# Patient Record
Sex: FEMALE | Race: WHITE
[De-identification: names, ages, dates, MRNs, and addresses within clinical notes are randomized per-mention and may not be internally consistent; named-entity substitution may affect disease eponyms.]

---

## 2018-08-27 ENCOUNTER — HOSPITAL ENCOUNTER (OUTPATIENT)
Age: 73
End: 2018-08-27
Payer: MEDICARE

## 2018-08-27 DIAGNOSIS — C54.9: ICD-10-CM

## 2018-08-27 DIAGNOSIS — R91.8: Primary | ICD-10-CM

## 2018-08-27 PROCEDURE — 71250 CT THORAX DX C-: CPT

## 2018-08-27 NOTE — DI.CT.S_ITS
PROCEDURE:  CT CHEST WO CON  
   
INDICATIONS:  lung nodule  
   
TECHNIQUE:    
Noncontrast 2.0-2.5 mm thick sections acquired from the pulmonary apices to the posterior   
costophrenic angles.  7 mm thick coronal and sagittal MIP reformats were then acquired.    
A low radiation dose technique was utilized.    
   
COMPARISON:  [facility], CT, CHEST/ABD/PEL WITH CONTRAST, 4/03/2014, 9:14.  [facility], CT, CHEST/ABD/PEL WITH CONTRAST, 4/23/2015, 9:13.  [facility], CT, THORAX   
WITHOUT CONTRAST, 10/29/2015, 8:29.  [facility], CT, CHEST/ABD/PEL WITH CONTRAST,   
8/09/2017, 10:44.  
   
FINDINGS:    
Image quality:  Diagnostic, given the low radiation dose technique.    
   
Lungs and pleura: 3 mm pulmonary nodule in the anterior right middle lobe, series 3/image   
75, remains unchanged from initial exam. Focal thickening in the right fissure, image 81,   
unchanged. Left lower lobe nodule is again seen anterolaterally on image 88. The 3 mm   
subpleural nodule in the superior segment left lower lobe, new on most recent prior study   
is persistent but unchanged, image 46. It in the anterior left upper lobe, image 36, is a   
round pulmonary nodule measuring 5 mm in diameter on current study, 4 mm on last exam and   
not apparent on exams prior to 2017. In addition, just inferior to the 5 mm nodule, image   
40, is a second 5 mm nodule that is new since last study. Also new on image 27, is a 6 mm   
subpleural nodule in the anterior left upper lobe with a 2 mm nodule immediately   
inferior.  
   
Mediastinum:  Heart size is normal. Mild coronary artery calcifications. No pericardial   
effusion.  No mediastinal adenopathy by size criteria.  Thoracic aorta and central   
pulmonary arteries are normal in size.  Esophagus is normal in caliber.  No hiatal   
hernia.    
   
Bones and chest wall:  No suspicious bony lesions.  No vertebral body compression   
fractures.  No axillary or supraclavicular adenopathy by size criteria.  Thyroid gland is   
normal in size.    
   
Abdomen:  Visualized upper abdomen solid organs and bowel loops appear normal in the   
absence of contrast.    
   
IMPRESSION:  
   
1. There are pulmonary nodules bilaterally that are unchanged from April 2014.  
   
2. There is a left upper lobe pulmonary nodules that was new in August 2017 and is   
increased slightly in size.  
   
3. There  are 3 new left upper lobe pulmonary nodules measuring up to 6 mm in maximum   
diameter. These nodules remain indeterminate but are of concern for metastatic disease   
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  
   
   
Fleischner Society criteria for SOLID lung nodule followup.    
Nodule size (mm)Low-risk patientHigh-risk patient<6 (single or multiple)No routine   
followup.Optional CT at 12 months. 6-8 (single or multiple)CT at 6-12 months, then   
optional CT at 18-24 mo.CT at 6-12 months, then CT at 18-24 months.  >8 (single)CT at 3   
months, PET-CT, or biopsy. Same as for low-risk pts.  >8 (multiple)CT at 3-6 months, then   
optional CT at 18-24 mo.CT at 3-6 months, then CT at 18-24 months.    
Recommendations do not apply to lung cancer screening, patients with immunosuppression,   
or patients with known primary cancer.   
   
   
   
Dictated by: Roque Sheth M.D. on 8/27/2018 at 10:02       
Approved by: Roque Sheth M.D. on 8/27/2018 at 10:42

## 2019-04-09 ENCOUNTER — HOSPITAL ENCOUNTER (OUTPATIENT)
Age: 74
End: 2019-04-09
Payer: MEDICARE

## 2019-04-09 DIAGNOSIS — R91.8: ICD-10-CM

## 2019-04-09 DIAGNOSIS — C55: Primary | ICD-10-CM

## 2019-04-09 PROCEDURE — 71250 CT THORAX DX C-: CPT

## 2019-04-09 NOTE — DI.CT.S_ITS
PROCEDURE:  CT CHEST WO CON  
   
INDICATIONS:  UTERINE SARCOMA, LUNG NODULES  
   
TECHNIQUE:    
Noncontrast 2.0-2.5 mm thick sections acquired from the pulmonary apices to the posterior   
costophrenic angles.  7 mm thick coronal and sagittal MIP reformats were then acquired.    
A low radiation dose technique was utilized.    
   
COMPARISON:  [facility], CT, CHEST/ABD/PEL WITH CONTRAST, 8/09/2017, 10:44.  [facility], CT, THORAX WITHOUT CONTRAST, 10/29/2015, 8:29.  [facility], CT,   
CHEST/ABD/PEL WITH CONTRAST, 4/23/2015, 9:13.  [facility], CT, CHEST/ABD/PEL WITH   
CONTRAST, 4/03/2014, 9:14.  [facility], CT, CT CHEST WO CON, 8/27/2018, 9:43.  
   
FINDINGS:    
Image quality:  Diagnostic, given the low radiation dose technique.    
   
Lungs and pleura: On the left there are 7 separate pulmonary nodules each of which has   
enlarged in size sided to a slight or moderate degree.  Several previously present   
nodules were of such a small size on the CT scanning 8/27/18 that they were not   
recognized as nodules separate from vessels are not their growth over time indicates each   
of these likely is a metastatic nodule.  A single nodule is present on the right,   
anterior border of the medial segment right middle lobe, and it has not definitely   
enlarged.  The left-sided nodules are seen 2 on image 30, 2 on image 40, one on image 44,   
one on image 51, 1 on image 79, and 1 just above the diaphragm on image 114.  A single   
nodule on the right is seen on image 77.  
   
Mediastinum:  Heart size is normal.  No pericardial effusion.  No mediastinal adenopathy   
by size criteria.  Thoracic aorta and central pulmonary arteries are normal in size.    
Esophagus is normal in caliber.  No hiatal hernia.    
   
Bones and chest wall:  No suspicious bony lesions.  No vertebral body compression   
fractures.  No axillary or supraclavicular adenopathy by size criteria.  Thyroid gland is   
not well-seen but is noncontrast technique..    
   
Abdomen:  Visualized upper abdomen solid organs and bowel loops appear normal in the   
absence of contrast.    
   
IMPRESSION: 7 left-sided pulmonary nodules have all enlarged either to a slight or   
moderate degree.  The largest pulmonary nodules are at the left upper lobe and measure up   
to 9-10 mm in maximal dimension.  Each of these likely is a metastatic nodule, slow   
growing.  
   
A single right-sided pulmonary nodule has not changed in size and may represent a small   
granuloma or quiecent metastatic nodule.  
   
   
   
Dictated by: Herrera Weinstein M.D. on 4/09/2019 at 15:04       
Approved by: Herrera Weinstein M.D. on 4/09/2019 at 15:16

## 2019-04-22 ENCOUNTER — HOSPITAL ENCOUNTER (OUTPATIENT)
Age: 74
Discharge: HOME | End: 2019-04-22
Payer: MEDICARE

## 2019-04-22 VITALS
SYSTOLIC BLOOD PRESSURE: 166 MMHG | TEMPERATURE: 98.6 F | OXYGEN SATURATION: 98 % | RESPIRATION RATE: 16 BRPM | HEART RATE: 71 BPM | DIASTOLIC BLOOD PRESSURE: 82 MMHG

## 2019-04-22 VITALS
SYSTOLIC BLOOD PRESSURE: 170 MMHG | RESPIRATION RATE: 17 BRPM | HEART RATE: 73 BPM | TEMPERATURE: 98.78 F | DIASTOLIC BLOOD PRESSURE: 86 MMHG | OXYGEN SATURATION: 97 %

## 2019-04-22 VITALS
TEMPERATURE: 98.6 F | DIASTOLIC BLOOD PRESSURE: 75 MMHG | HEART RATE: 76 BPM | OXYGEN SATURATION: 99 % | SYSTOLIC BLOOD PRESSURE: 172 MMHG | RESPIRATION RATE: 17 BRPM

## 2019-04-22 VITALS
OXYGEN SATURATION: 98 % | RESPIRATION RATE: 18 BRPM | HEART RATE: 78 BPM | SYSTOLIC BLOOD PRESSURE: 171 MMHG | DIASTOLIC BLOOD PRESSURE: 80 MMHG

## 2019-04-22 VITALS
DIASTOLIC BLOOD PRESSURE: 83 MMHG | OXYGEN SATURATION: 96 % | TEMPERATURE: 97.2 F | HEART RATE: 80 BPM | RESPIRATION RATE: 16 BRPM | SYSTOLIC BLOOD PRESSURE: 186 MMHG

## 2019-04-22 VITALS
SYSTOLIC BLOOD PRESSURE: 157 MMHG | DIASTOLIC BLOOD PRESSURE: 90 MMHG | OXYGEN SATURATION: 98 % | HEART RATE: 76 BPM | RESPIRATION RATE: 18 BRPM

## 2019-04-22 VITALS
OXYGEN SATURATION: 99 % | HEART RATE: 76 BPM | RESPIRATION RATE: 20 BRPM | DIASTOLIC BLOOD PRESSURE: 79 MMHG | SYSTOLIC BLOOD PRESSURE: 156 MMHG

## 2019-04-22 VITALS
TEMPERATURE: 97.16 F | DIASTOLIC BLOOD PRESSURE: 75 MMHG | RESPIRATION RATE: 16 BRPM | HEART RATE: 78 BPM | OXYGEN SATURATION: 98 % | SYSTOLIC BLOOD PRESSURE: 184 MMHG

## 2019-04-22 VITALS
HEART RATE: 76 BPM | RESPIRATION RATE: 20 BRPM | DIASTOLIC BLOOD PRESSURE: 73 MMHG | SYSTOLIC BLOOD PRESSURE: 166 MMHG | OXYGEN SATURATION: 99 %

## 2019-04-22 VITALS
SYSTOLIC BLOOD PRESSURE: 162 MMHG | HEART RATE: 77 BPM | DIASTOLIC BLOOD PRESSURE: 79 MMHG | OXYGEN SATURATION: 99 % | RESPIRATION RATE: 20 BRPM

## 2019-04-22 VITALS
SYSTOLIC BLOOD PRESSURE: 183 MMHG | TEMPERATURE: 97.7 F | OXYGEN SATURATION: 99 % | RESPIRATION RATE: 17 BRPM | HEART RATE: 79 BPM | DIASTOLIC BLOOD PRESSURE: 90 MMHG

## 2019-04-22 VITALS — BODY MASS INDEX: 26.5 KG/M2

## 2019-04-22 VITALS
RESPIRATION RATE: 20 BRPM | SYSTOLIC BLOOD PRESSURE: 164 MMHG | HEART RATE: 70 BPM | OXYGEN SATURATION: 100 % | DIASTOLIC BLOOD PRESSURE: 88 MMHG

## 2019-04-22 DIAGNOSIS — R91.8: Primary | ICD-10-CM

## 2019-04-22 DIAGNOSIS — Z85.42: ICD-10-CM

## 2019-04-22 LAB
ADD MANUAL DIFF / SLIDE REVIEW: NO
HEMATOCRIT: 40 % (ref 36–46)
HEMOGLOBIN: 13.6 G/DL (ref 12–16)
INR PPP: 1.1 (ref 0.9–1.3)
LYMPHOCYTES # SPEC AUTO: 1600 /UL (ref 1100–4500)
MCV RBC: 93.6 FL (ref 80–100)
MEAN CORPUSCULAR HEMOGLOBIN: 31.9 PG (ref 26–34)
MEAN CORPUSCULAR HGB CONC: 34.1 % (ref 30–36)
PLATELET COUNT: 241 X10^3/UL (ref 150–400)
PROTHROMBIN TIME: 13.3 SECONDS (ref 10.1–12.7)

## 2019-04-22 PROCEDURE — 71045 X-RAY EXAM CHEST 1 VIEW: CPT

## 2019-04-22 PROCEDURE — BB24ZZZ COMPUTERIZED TOMOGRAPHY (CT SCAN) OF BILATERAL LUNGS: ICD-10-PCS

## 2019-04-22 PROCEDURE — 77012 CT SCAN FOR NEEDLE BIOPSY: CPT

## 2019-04-22 PROCEDURE — 36415 COLL VENOUS BLD VENIPUNCTURE: CPT

## 2019-04-22 PROCEDURE — 85610 PROTHROMBIN TIME: CPT

## 2019-04-22 PROCEDURE — 85025 COMPLETE CBC W/AUTO DIFF WBC: CPT

## 2019-04-22 PROCEDURE — 32405: CPT

## 2019-04-22 PROCEDURE — 88305 TISSUE EXAM BY PATHOLOGIST: CPT

## 2019-04-22 NOTE — PC.NURSE
Procedure finished at 1119, pt did not receive sedation for this.  She coughed up a little bit of blood post left lung biopsy. Took pt to PACU and handed off report to Kojo FREIRE for continued monitoring.

## 2019-04-22 NOTE — PATH_ITS
[facility] Accession Number: [id number]  
.                                                                01  
Material submitted:                                        .  
lung - LEFT LUNG  
.                                                                01  
Clinical history:                                          .  
ANTERIOR WALDEMAR NODULE 1CM, PRIOR H/O UTERINE  
ENDOMETRIAL SARCOMA. SEVERAL SMALL PALM  
NODULES ALL RECENTLY GROWING IN SIZE  
.                                                                02  
**********************************************************************  
Diagnosis:  
Left Lung, Core Needle Biopsy:  
     Benign pulmonary parenchymal fragments and blood.  
     No evidence of neoplasm.  
MRV/04/24/2019  
**********************************************************************  
.                                                                02  
Comment:  
Dr. David left a message with Dr. Bell's office to discuss  
results on 04/24/2019.  
.                                                                02  
Electronically signed:                                     .  
Zaida David MD, Pathologist  
NPI- [id number]  
.                                                                01  
Gross description:                                         .  
Received in one formalin-filled container, labeled with the patient's name  
and designated  anterior WALDEMAR nodule , are multiple extremely tiny  
fragments of dark red-tan tissue that range in size from less than 0.1 cm  
to 0.1 cm. The specimen is entirely submitted in one cassette. (DC:[id number]  
[id number])  
/FRR  
.                                                                02  
Microscopic:                                                    .  
Additional deeper levels were examined.  
.                                                                02  
Pathologist provided ICD-10:  
R91.8  
.                                                                02  
CPT                                                        .  
[id number]  
***Performed at:  01  
   [facility]  
   [address] [facility], [place], [place]  [id number]  
   MD Daniel Toweill MD Phone:  [phone]  
***Performed at:  02  
   [facility]  
   [address] [place], [place]  [id number]  
   MD Zaida David MD Phone:  [phone]

## 2019-04-22 NOTE — DI.CT.S_ITS
PROCEDURE:  CT BIOPSY LUNG LT  
Sedation analgesia not utilized.  
   
INDICATIONS: Prior history of uterine sarcoma, recent CT scanning showing several   
pulmonary nodules which are sharply demarcated and have definitely enlarged in size   
indicating potential growth of metastatic disease as the underlying cause.  
   
TECHNIQUE:    
The indications, alternatives, benefits, risks, and possible complications of the   
procedure were communicated to the patient.  Informed written consent from the patient   
was obtained and placed in the chart.  Continuous EKG and hemodynamic monitoring was   
started by trained personnel.    
   
The patient was brought to the CT suite and  spiral CT imaging was performed with   
localization grid.  The appropriate site for percutaneous access to the biopsy target was   
marked, was prepped and draped sterilely, and was infused with local anaesthesia.  Under   
CT guidance, a core biopsy trocar and needle set was advanced to the biopsy target, and   
specimen(s) were obtained.  The trocar and needle were then removed, and the patient was   
sent for post-procedure monitoring.    
   
COMPARISON:  [facility], CT, CT CHEST WO CON, 4/09/2019, 11:01.  
   
FINDINGS:    
Biopsy site: Anterior left upper lobe, rib positioning required angulation of the biopsy   
device laterally from a medial approach.  Upper lobe nodule biopsy was chosen due to the   
relatively reduced amount of breathing motion that is transmitted to the upper lobes when   
compared to the mid and lower lungs.  
Needle: 20 gauge biopsy needle with a 19 gauge introducer trocar.    
Number of passes: 4, with 2 performed initially and then 2 additional performed with   
slight cephalad angulation, targeted to the nodule that was accessible within the upper   
lobe.  
Medications: None needed for sedation.  
Complications:  None.    
   
IMPRESSION:  Successful CT-guided biopsy procedure of an anterior left upper lobe nodule.   
 Please note that these nodules are at the threshold for accurate access for CT-guided   
biopsy and breathing motion significantly changes the positioning of the nodules in   
relationship to the access point during the procedure.  Several of these nodules present   
are obscured by overlying ribs.  Should the current biopsy procedure yields no diagnostic   
material I would recommend obtaining a nuclear medicine PET CT to determine whether these   
nodules are positive on PET CT imaging in whether that would obviate the need for a more   
aggressive biopsy approach such as peripheral nodule localization and peripheral wedge   
resection of the portion of the lung parenchyma containing a nodule.    
   
   
   
Dictated by: Herrera Weinstein M.D. on 4/22/2019 at 14:12       
Approved by: Herrera Weinstein M.D. on 4/22/2019 at 14:20

## 2019-04-22 NOTE — DI.RAD.S_ITS
PROCEDURE:  XR CHEST 1V  
   
INDICATIONS:  post thoracentesis  
   
TECHNIQUE:  One view of the chest was acquired.    
   
COMPARISON:  [facility], CT, CT BIOPSY LUNG LT, 4/22/2019, 10:45.  [facility]  Identified   
by CT scanning during the procedure, and this would not be [facility], CT, CT CHEST WO   
CON, 4/09/2019, 11:01.  
   
FINDINGS:    
   
Surgical changes and devices:  None.    
   
Lungs and pleura:  Lungs are stable with a small nodular radiodensity that was biopsied   
earlier this morning, left upper lobe.  A slight amount of post biopsy hemorrhage was   
identified by CT scanning at the termination of the procedure, which has not changed.  No   
pleural effusions or pneumothorax.    
   
Mediastinum:  Mediastinal contours appear normal.  Heart size is normal.    
   
Bones and chest wall:  No suspicious bony lesions.  Overlying soft tissues appear   
unremarkable.    
   
IMPRESSION: No pneumothorax after CT guided biopsy of a left upper lobe pulmonary nodule.   
 As noted there was a slight degree of hemorrhage associated with the biopsy procedure   
within the lung parenchyma which could still be seen, but has not increased.  Pathology   
results are pending.  
   
   
   
Dictated by: Herrera Weinstein M.D. on 4/22/2019 at 12:48       
Approved by: Herrera Weinstein M.D. on 4/22/2019 at 12:52

## 2019-04-22 NOTE — SUR.PHASEII
Care assumed from Monica Olivares to l chest c/d/i. No sob. Pt to xray and back w/o difficulty. Drinking fluids, no complaints.

## 2020-03-02 ENCOUNTER — HOSPITAL ENCOUNTER (OUTPATIENT)
Age: 75
End: 2020-03-02
Payer: MEDICARE

## 2020-03-02 DIAGNOSIS — C54.9: Primary | ICD-10-CM

## 2020-03-02 DIAGNOSIS — E04.9: ICD-10-CM

## 2020-03-02 DIAGNOSIS — R91.8: ICD-10-CM

## 2020-03-02 DIAGNOSIS — M51.34: ICD-10-CM

## 2020-03-02 DIAGNOSIS — M51.36: ICD-10-CM

## 2020-03-02 LAB
ALBUMIN SERPL-MCNC: 4.6 G/DL (ref 3.5–5)
ALBUMIN/GLOB SERPL: 1.4 {RATIO} (ref 1–2.8)
ALP SERPL-CCNC: 48 U/L (ref 38–126)
ALT SERPL-CCNC: 20 IU/L (ref ?–35)
BUN SERPL-MCNC: 16 MG/DL (ref 7–17)
CALCIUM SERPL-MCNC: 10.2 MG/DL (ref 8.4–10.2)
CHLORIDE SERPL-SCNC: 102 MMOL/L (ref 98–107)
CO2 SERPL-SCNC: 30 MMOL/L (ref 22–32)
ESTIMATED GLOMERULAR FILT RATE: > 60 ML/MIN (ref 60–?)
GLOBULIN SER CALC-MCNC: 3.2 G/DL (ref 1.7–4.1)
GLUCOSE SERPL-MCNC: 94 MG/DL (ref 80–110)
HEMOLYSIS: 15 (ref 0–50)
POTASSIUM SERPL-SCNC: 4.1 MMOL/L (ref 3.4–5.1)
PROT SERPL-MCNC: 7.8 G/DL (ref 6.3–8.2)
SODIUM SERPL-SCNC: 141 MMOL/L (ref 137–145)

## 2020-03-02 PROCEDURE — 36415 COLL VENOUS BLD VENIPUNCTURE: CPT

## 2020-03-02 PROCEDURE — 71260 CT THORAX DX C+: CPT

## 2020-03-02 PROCEDURE — 74177 CT ABD & PELVIS W/CONTRAST: CPT

## 2020-03-02 PROCEDURE — 80053 COMPREHEN METABOLIC PANEL: CPT

## 2020-03-02 NOTE — DI.CT.S_ITS
"PROCEDURE:  CT CHEST ABD PEL W CON  
   
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  
   
TECHNIQUE:    
After the administration of oral and intravenous contrast, 5 mm thick sections acquired   
from the lung apices to the symphysis.  5 mm coronal and sagittal reformats were   
performed, with additional 7 mm coronal MIP reformats through the lungs.  For radiation   
dose reduction, the following was used:  automated exposure control, adjustment of mA   
and/or kV according to patient size.    
   
COMPARISON:  [facility], [place], PET NECK TO MID THIGH, 8/02/2019, 14:41.  [facility], CT, CHEST/ABD/PEL WITH CONTRAST, 8/09/2017, 10:44.  
   
FINDINGS:    
Image quality:  Excellent.    
   
CHEST:    
Lungs and pleura: Again noted are postsurgical changes in anterior medial left lung from   
prior wedge resection. Previously described stellate lesion in medial left upper lobe is   
more linear in appearance and is most consistent with post surgical scarring. 5 mm soft   
tissue density nodule is seen in lateral aspect of left upper lobe, increased from 3 mm   
on previous study series 3 image 83. 4-5 mm subpleural nodule in posterior medial right   
apex is seen, unchanged from previous PET/CT scan and show no abnormal increased uptake   
series 3 image 48. Previously described 7 mm ground glass density nodule in lateral left   
lower lobe is no longer present and likely represent resolved infiltrate/atelectasis. No   
other pulmonary nodule or mass is seen. No pleural effusions or pneumothorax.  Central   
and peripheral airways appear patent and normal in caliber.    
   
Mediastinum:  Heart size is mildly enlarged.  No pericardial effusion.  No mediastinal or   
hilar adenopathy by size criteria.  Thoracic aorta and central pulmonary arteries are   
normal in size.  Esophagus is normal in caliber.  No hiatal hernia.    
   
Chest wall:  No axillary or supraclavicular adenopathy by size criteria.  Thyroid gland   
is mildly enlarged with subcentimeter hypodense nodules seen in bilateral thyroid lobes,   
concerning for multinodular goiter.  
   
   
ABDOMEN:    
Solid organs:  Liver is normal in size and enhancement.  Gallbladder is within normal   
limits.  Biliary system is non dilated.  Pancreas enhances normally.  Spleen is normal in   
size and enhancement.  No adrenal nodules.  Kidneys demonstrate normal size and   
enhancement, without hydronephrosis.    
   
Peritoneum and bowel:  Bowel loops demonstrate normal wall thickness and caliber.  No   
free fluid or air.  Appendix is visualized and is within normal limits. Extensive colonic   
diverticulosis is seen, no CT evidence of acute diverticulitis.  
   
Nodes and vessels:  No retroperitoneal or mesenteric adenopathy by size criteria.  Aorta   
and inferior vena cava are normal in size.    
   
Miscellaneous:  No ventral hernias.    
   
   
PELVIS:    
Genitourinary:  Bladder wall thickness is normal.    
   
Miscellaneous:  No inguinal hernias or adenopathy.  Numerous phleboliths are noted in   
lower pelvis.  
   
Bones:  No suspicious bony lesions.  No vertebral body compression fractures.    
   
IMPRESSION:  
1. Postsurgical changes in left lung from prior wedge resection. Likely surgical scar   
seen in medial left upper lobe with more linear appearance on the current study compared   
to previous PET/CT scan. Previously noted left upper lobe soft tissue density nodule has   
slightly increased in size from 3 mm to 5 mm on the current study although no increased   
uptake was noted on previous study. Previously described 7 mm ground glass density nodule   
in lateral left lower lobe is no longer present. Previously noted 4-5 mm subpleural   
nodule in medial right apex is unchanged. No new pulmonary nodule is seen. No pleural   
effusion or pneumothorax. Airway is patent.  
2. No mediastinal or hilar lymphadenopathy is seen. No abdominal or pelvic   
lymph
[id number]|2020-02-29 09:47:00|2020-02-29 10:16:00|DI.MG.S_ITS|[facility]|Imaging|[id number]|"BILATERAL DIGITAL SCREENING MAMMOGRAM 3D/2D WITH CAD POST LUMPECTOMY: 2/29/2020

## 2022-02-11 ENCOUNTER — HOSPITAL ENCOUNTER (OUTPATIENT)
Age: 77
End: 2022-02-11
Payer: MEDICARE

## 2022-02-11 DIAGNOSIS — C54.1: Primary | ICD-10-CM

## 2022-02-11 DIAGNOSIS — R91.8: ICD-10-CM

## 2022-02-11 LAB
ALBUMIN SERPL-MCNC: 4.3 G/DL (ref 3.5–5)
ALBUMIN/GLOB SERPL: 1.5 {RATIO} (ref 1–2.8)
ALP SERPL-CCNC: 68 U/L (ref 38–126)
ALT SERPL-CCNC: 18 IU/L (ref ?–35)
BUN SERPL-MCNC: 12 MG/DL (ref 7–17)
CALCIUM SERPL-MCNC: 10.2 MG/DL (ref 8.4–10.2)
CHLORIDE SERPL-SCNC: 102 MMOL/L (ref 98–107)
CO2 SERPL-SCNC: 33 MMOL/L (ref 22–32)
ESTIMATED GLOMERULAR FILT RATE: > 60 ML/MIN (ref 60–?)
GLOBULIN SER CALC-MCNC: 2.9 G/DL (ref 1.7–4.1)
GLUCOSE SERPL-MCNC: 102 MG/DL (ref 80–110)
HEMOLYSIS: < 15 (ref 0–50)
POTASSIUM SERPL-SCNC: 3.9 MMOL/L (ref 3.4–5.1)
PROT SERPL-MCNC: 7.2 G/DL (ref 6.3–8.2)
SODIUM SERPL-SCNC: 140 MMOL/L (ref 137–145)

## 2022-02-11 PROCEDURE — 71260 CT THORAX DX C+: CPT

## 2022-02-11 PROCEDURE — 36415 COLL VENOUS BLD VENIPUNCTURE: CPT

## 2022-02-11 PROCEDURE — 80053 COMPREHEN METABOLIC PANEL: CPT

## 2022-08-15 ENCOUNTER — HOSPITAL ENCOUNTER (OUTPATIENT)
Age: 77
End: 2022-08-15
Payer: MEDICARE

## 2022-08-15 DIAGNOSIS — C54.9: Primary | ICD-10-CM

## 2022-08-15 DIAGNOSIS — C78.01: ICD-10-CM

## 2022-08-15 DIAGNOSIS — C78.02: ICD-10-CM

## 2022-08-15 LAB
ALBUMIN SERPL-MCNC: 4.1 G/DL (ref 3.5–5)
ALBUMIN/GLOB SERPL: 1.3 {RATIO} (ref 1–2.8)
ALP SERPL-CCNC: 42 U/L (ref 38–126)
ALT SERPL-CCNC: 17 IU/L (ref ?–35)
BUN SERPL-MCNC: 14 MG/DL (ref 7–17)
CALCIUM SERPL-MCNC: 9 MG/DL (ref 8.4–10.2)
CHLORIDE SERPL-SCNC: 108 MMOL/L (ref 98–107)
CO2 SERPL-SCNC: 26 MMOL/L (ref 22–32)
ESTIMATED GLOMERULAR FILT RATE: > 60 ML/MIN (ref 60–?)
GLOBULIN SER CALC-MCNC: 3.1 G/DL (ref 1.7–4.1)
GLUCOSE SERPL-MCNC: 81 MG/DL (ref 80–110)
HEMOLYSIS: < 15 (ref 0–50)
POTASSIUM SERPL-SCNC: 4 MMOL/L (ref 3.4–5.1)
PROT SERPL-MCNC: 7.2 G/DL (ref 6.3–8.2)
SODIUM SERPL-SCNC: 139 MMOL/L (ref 137–145)

## 2022-08-15 PROCEDURE — 71260 CT THORAX DX C+: CPT

## 2022-08-15 PROCEDURE — 36415 COLL VENOUS BLD VENIPUNCTURE: CPT

## 2022-08-15 PROCEDURE — 80053 COMPREHEN METABOLIC PANEL: CPT

## 2022-10-10 ENCOUNTER — HOSPITAL ENCOUNTER (OUTPATIENT)
Age: 77
End: 2022-10-10
Payer: MEDICARE

## 2022-10-10 DIAGNOSIS — Z20.822: Primary | ICD-10-CM

## 2022-10-10 DIAGNOSIS — Z01.812: ICD-10-CM

## 2022-10-10 PROCEDURE — 87635 SARS-COV-2 COVID-19 AMP PRB: CPT

## 2022-10-11 ENCOUNTER — HOSPITAL ENCOUNTER (OUTPATIENT)
Age: 77
Discharge: HOME | End: 2022-10-11
Payer: MEDICARE

## 2022-10-11 DIAGNOSIS — Z45.2: Primary | ICD-10-CM

## 2022-10-11 DIAGNOSIS — Z53.09: ICD-10-CM

## 2022-10-11 PROCEDURE — 36561 INSERT TUNNELED CV CATH: CPT

## 2022-10-17 ENCOUNTER — HOSPITAL ENCOUNTER (OUTPATIENT)
Age: 77
End: 2022-10-17
Payer: MEDICARE

## 2022-10-17 ENCOUNTER — HOSPITAL ENCOUNTER (OUTPATIENT)
Age: 77
Discharge: HOME | End: 2022-10-17
Payer: MEDICARE

## 2022-10-17 VITALS
SYSTOLIC BLOOD PRESSURE: 151 MMHG | RESPIRATION RATE: 16 BRPM | HEART RATE: 77 BPM | OXYGEN SATURATION: 98 % | DIASTOLIC BLOOD PRESSURE: 77 MMHG | TEMPERATURE: 97.3 F

## 2022-10-17 VITALS
TEMPERATURE: 97.4 F | RESPIRATION RATE: 16 BRPM | HEART RATE: 66 BPM | SYSTOLIC BLOOD PRESSURE: 161 MMHG | OXYGEN SATURATION: 100 % | DIASTOLIC BLOOD PRESSURE: 74 MMHG

## 2022-10-17 VITALS
DIASTOLIC BLOOD PRESSURE: 75 MMHG | TEMPERATURE: 97.34 F | OXYGEN SATURATION: 100 % | RESPIRATION RATE: 14 BRPM | HEART RATE: 64 BPM | SYSTOLIC BLOOD PRESSURE: 155 MMHG

## 2022-10-17 VITALS
DIASTOLIC BLOOD PRESSURE: 75 MMHG | RESPIRATION RATE: 14 BRPM | SYSTOLIC BLOOD PRESSURE: 151 MMHG | HEART RATE: 65 BPM | OXYGEN SATURATION: 100 %

## 2022-10-17 VITALS
HEART RATE: 103 BPM | RESPIRATION RATE: 16 BRPM | DIASTOLIC BLOOD PRESSURE: 91 MMHG | SYSTOLIC BLOOD PRESSURE: 165 MMHG | OXYGEN SATURATION: 98 % | TEMPERATURE: 97.16 F

## 2022-10-17 VITALS
OXYGEN SATURATION: 98 % | DIASTOLIC BLOOD PRESSURE: 84 MMHG | HEART RATE: 75 BPM | SYSTOLIC BLOOD PRESSURE: 159 MMHG | RESPIRATION RATE: 21 BRPM

## 2022-10-17 VITALS
DIASTOLIC BLOOD PRESSURE: 106 MMHG | RESPIRATION RATE: 14 BRPM | TEMPERATURE: 97.4 F | HEART RATE: 75 BPM | OXYGEN SATURATION: 100 % | SYSTOLIC BLOOD PRESSURE: 146 MMHG

## 2022-10-17 VITALS
HEART RATE: 71 BPM | DIASTOLIC BLOOD PRESSURE: 72 MMHG | RESPIRATION RATE: 14 BRPM | OXYGEN SATURATION: 99 % | SYSTOLIC BLOOD PRESSURE: 161 MMHG

## 2022-10-17 VITALS — BODY MASS INDEX: 26.4 KG/M2

## 2022-10-17 DIAGNOSIS — Z01.812: Primary | ICD-10-CM

## 2022-10-17 DIAGNOSIS — Z20.822: ICD-10-CM

## 2022-10-17 DIAGNOSIS — C54.1: Primary | ICD-10-CM

## 2022-10-17 DIAGNOSIS — Z45.2: ICD-10-CM

## 2022-10-17 DIAGNOSIS — Z01.812: ICD-10-CM

## 2022-10-17 DIAGNOSIS — J45.909: ICD-10-CM

## 2022-10-17 PROCEDURE — 36561 INSERT TUNNELED CV CATH: CPT

## 2022-10-17 PROCEDURE — 76000 FLUOROSCOPY <1 HR PHYS/QHP: CPT

## 2022-10-17 PROCEDURE — 71045 X-RAY EXAM CHEST 1 VIEW: CPT

## 2022-10-17 PROCEDURE — 87635 SARS-COV-2 COVID-19 AMP PRB: CPT

## 2022-10-17 PROCEDURE — C1788 PORT, INDWELLING, IMP: HCPCS

## 2022-10-17 PROCEDURE — 00532 ANES ACCESS CTR VENOUS CRCJ: CPT

## 2023-02-15 ENCOUNTER — HOSPITAL ENCOUNTER (OUTPATIENT)
Age: 78
End: 2023-02-15
Payer: MEDICARE

## 2023-02-15 DIAGNOSIS — C54.1: Primary | ICD-10-CM

## 2023-02-15 DIAGNOSIS — R91.8: ICD-10-CM

## 2023-02-15 DIAGNOSIS — K76.0: ICD-10-CM

## 2023-02-15 DIAGNOSIS — R19.00: ICD-10-CM

## 2023-02-15 DIAGNOSIS — K57.90: ICD-10-CM

## 2023-02-15 DIAGNOSIS — E27.9: ICD-10-CM

## 2023-02-15 PROCEDURE — 71260 CT THORAX DX C+: CPT

## 2023-02-15 PROCEDURE — 74177 CT ABD & PELVIS W/CONTRAST: CPT

## 2023-03-23 ENCOUNTER — HOSPITAL ENCOUNTER (OUTPATIENT)
Age: 78
End: 2023-03-23
Payer: MEDICARE

## 2023-03-23 DIAGNOSIS — Z01.818: Primary | ICD-10-CM

## 2023-03-23 PROCEDURE — 93005 ELECTROCARDIOGRAM TRACING: CPT

## 2023-03-23 PROCEDURE — 93010 ELECTROCARDIOGRAM REPORT: CPT

## 2023-07-25 ENCOUNTER — HOSPITAL ENCOUNTER (EMERGENCY)
Age: 78
LOS: 1 days | Discharge: ANOTHER HOSPITAL | End: 2023-07-26
Payer: MEDICARE

## 2023-07-25 VITALS
HEART RATE: 100 BPM | OXYGEN SATURATION: 89 % | SYSTOLIC BLOOD PRESSURE: 122 MMHG | DIASTOLIC BLOOD PRESSURE: 92 MMHG | RESPIRATION RATE: 41 BRPM

## 2023-07-25 VITALS — OXYGEN SATURATION: 87 % | HEART RATE: 101 BPM | RESPIRATION RATE: 36 BRPM

## 2023-07-25 VITALS
RESPIRATION RATE: 42 BRPM | SYSTOLIC BLOOD PRESSURE: 124 MMHG | HEART RATE: 96 BPM | DIASTOLIC BLOOD PRESSURE: 58 MMHG | OXYGEN SATURATION: 89 %

## 2023-07-25 VITALS
DIASTOLIC BLOOD PRESSURE: 69 MMHG | RESPIRATION RATE: 34 BRPM | HEART RATE: 87 BPM | OXYGEN SATURATION: 97 % | SYSTOLIC BLOOD PRESSURE: 126 MMHG

## 2023-07-25 VITALS
RESPIRATION RATE: 49 BRPM | SYSTOLIC BLOOD PRESSURE: 138 MMHG | HEART RATE: 108 BPM | OXYGEN SATURATION: 80 % | DIASTOLIC BLOOD PRESSURE: 70 MMHG

## 2023-07-25 VITALS — OXYGEN SATURATION: 76 % | RESPIRATION RATE: 30 BRPM | HEART RATE: 109 BPM

## 2023-07-25 VITALS
DIASTOLIC BLOOD PRESSURE: 89 MMHG | RESPIRATION RATE: 45 BRPM | OXYGEN SATURATION: 77 % | HEART RATE: 102 BPM | SYSTOLIC BLOOD PRESSURE: 128 MMHG

## 2023-07-25 VITALS
SYSTOLIC BLOOD PRESSURE: 135 MMHG | OXYGEN SATURATION: 95 % | DIASTOLIC BLOOD PRESSURE: 64 MMHG | RESPIRATION RATE: 34 BRPM | HEART RATE: 91 BPM

## 2023-07-25 VITALS
SYSTOLIC BLOOD PRESSURE: 129 MMHG | HEART RATE: 97 BPM | DIASTOLIC BLOOD PRESSURE: 58 MMHG | RESPIRATION RATE: 39 BRPM | OXYGEN SATURATION: 87 %

## 2023-07-25 VITALS — RESPIRATION RATE: 33 BRPM | HEART RATE: 99 BPM | OXYGEN SATURATION: 91 %

## 2023-07-25 VITALS
HEART RATE: 96 BPM | SYSTOLIC BLOOD PRESSURE: 121 MMHG | RESPIRATION RATE: 38 BRPM | OXYGEN SATURATION: 93 % | DIASTOLIC BLOOD PRESSURE: 67 MMHG

## 2023-07-25 VITALS
DIASTOLIC BLOOD PRESSURE: 60 MMHG | RESPIRATION RATE: 35 BRPM | OXYGEN SATURATION: 97 % | SYSTOLIC BLOOD PRESSURE: 127 MMHG | HEART RATE: 88 BPM

## 2023-07-25 VITALS — OXYGEN SATURATION: 95 % | RESPIRATION RATE: 34 BRPM | HEART RATE: 100 BPM

## 2023-07-25 VITALS
DIASTOLIC BLOOD PRESSURE: 67 MMHG | RESPIRATION RATE: 36 BRPM | OXYGEN SATURATION: 93 % | HEART RATE: 97 BPM | SYSTOLIC BLOOD PRESSURE: 145 MMHG

## 2023-07-25 VITALS — HEART RATE: 103 BPM | OXYGEN SATURATION: 78 % | RESPIRATION RATE: 50 BRPM

## 2023-07-25 VITALS
HEART RATE: 104 BPM | OXYGEN SATURATION: 90 % | SYSTOLIC BLOOD PRESSURE: 136 MMHG | DIASTOLIC BLOOD PRESSURE: 75 MMHG | RESPIRATION RATE: 39 BRPM

## 2023-07-25 VITALS
SYSTOLIC BLOOD PRESSURE: 130 MMHG | DIASTOLIC BLOOD PRESSURE: 61 MMHG | HEART RATE: 94 BPM | RESPIRATION RATE: 31 BRPM | OXYGEN SATURATION: 96 %

## 2023-07-25 VITALS
RESPIRATION RATE: 44 BRPM | HEART RATE: 97 BPM | SYSTOLIC BLOOD PRESSURE: 133 MMHG | OXYGEN SATURATION: 80 % | DIASTOLIC BLOOD PRESSURE: 60 MMHG

## 2023-07-25 VITALS
HEART RATE: 91 BPM | SYSTOLIC BLOOD PRESSURE: 134 MMHG | RESPIRATION RATE: 33 BRPM | DIASTOLIC BLOOD PRESSURE: 79 MMHG | OXYGEN SATURATION: 93 %

## 2023-07-25 VITALS
RESPIRATION RATE: 27 BRPM | OXYGEN SATURATION: 95 % | HEART RATE: 88 BPM | DIASTOLIC BLOOD PRESSURE: 66 MMHG | SYSTOLIC BLOOD PRESSURE: 136 MMHG

## 2023-07-25 VITALS
DIASTOLIC BLOOD PRESSURE: 78 MMHG | RESPIRATION RATE: 37 BRPM | SYSTOLIC BLOOD PRESSURE: 144 MMHG | HEART RATE: 100 BPM | OXYGEN SATURATION: 89 %

## 2023-07-25 VITALS
RESPIRATION RATE: 40 BRPM | DIASTOLIC BLOOD PRESSURE: 65 MMHG | SYSTOLIC BLOOD PRESSURE: 139 MMHG | HEART RATE: 111 BPM | OXYGEN SATURATION: 65 %

## 2023-07-25 VITALS
DIASTOLIC BLOOD PRESSURE: 67 MMHG | SYSTOLIC BLOOD PRESSURE: 139 MMHG | HEART RATE: 89 BPM | RESPIRATION RATE: 33 BRPM | OXYGEN SATURATION: 97 %

## 2023-07-25 VITALS
RESPIRATION RATE: 36 BRPM | SYSTOLIC BLOOD PRESSURE: 128 MMHG | OXYGEN SATURATION: 93 % | DIASTOLIC BLOOD PRESSURE: 59 MMHG | HEART RATE: 90 BPM

## 2023-07-25 VITALS
HEART RATE: 97 BPM | OXYGEN SATURATION: 90 % | DIASTOLIC BLOOD PRESSURE: 64 MMHG | RESPIRATION RATE: 42 BRPM | SYSTOLIC BLOOD PRESSURE: 126 MMHG

## 2023-07-25 VITALS
DIASTOLIC BLOOD PRESSURE: 68 MMHG | HEART RATE: 98 BPM | RESPIRATION RATE: 37 BRPM | OXYGEN SATURATION: 94 % | SYSTOLIC BLOOD PRESSURE: 139 MMHG

## 2023-07-25 VITALS
RESPIRATION RATE: 33 BRPM | SYSTOLIC BLOOD PRESSURE: 125 MMHG | DIASTOLIC BLOOD PRESSURE: 70 MMHG | HEART RATE: 91 BPM | OXYGEN SATURATION: 98 %

## 2023-07-25 VITALS
HEART RATE: 96 BPM | DIASTOLIC BLOOD PRESSURE: 60 MMHG | SYSTOLIC BLOOD PRESSURE: 127 MMHG | RESPIRATION RATE: 37 BRPM | OXYGEN SATURATION: 94 %

## 2023-07-25 VITALS
RESPIRATION RATE: 31 BRPM | OXYGEN SATURATION: 96 % | DIASTOLIC BLOOD PRESSURE: 62 MMHG | SYSTOLIC BLOOD PRESSURE: 124 MMHG | HEART RATE: 85 BPM

## 2023-07-25 VITALS — HEART RATE: 110 BPM | OXYGEN SATURATION: 82 % | RESPIRATION RATE: 24 BRPM

## 2023-07-25 VITALS
SYSTOLIC BLOOD PRESSURE: 130 MMHG | RESPIRATION RATE: 42 BRPM | HEART RATE: 98 BPM | DIASTOLIC BLOOD PRESSURE: 60 MMHG | OXYGEN SATURATION: 91 %

## 2023-07-25 VITALS — BODY MASS INDEX: 22.6 KG/M2

## 2023-07-25 DIAGNOSIS — I51.3: Primary | ICD-10-CM

## 2023-07-25 DIAGNOSIS — R79.89: ICD-10-CM

## 2023-07-25 DIAGNOSIS — R06.02: ICD-10-CM

## 2023-07-25 LAB
ADD MANUAL DIFF / SLIDE REVIEW: NO
ALBUMIN SERPL-MCNC: 4 G/DL (ref 3.5–5)
ALBUMIN/GLOB SERPL: 1.2 {RATIO} (ref 1–2.8)
ALP SERPL-CCNC: 65 U/L (ref 38–126)
ALT SERPL-CCNC: 19 IU/L (ref ?–35)
BASE EXCESS VBG: 0 MMOL/L (ref 0–4)
BUN SERPL-MCNC: 15 MG/DL (ref 7–17)
CALCIUM SERPL-MCNC: 9.1 MG/DL (ref 8.4–10.2)
CHLORIDE SERPL-SCNC: 100 MMOL/L (ref 98–107)
CK SERPL-CCNC: 121 U/L (ref 30–135)
CO2 SERPL-SCNC: 24 MMOL/L (ref 22–32)
ESTIMATED GLOMERULAR FILT RATE: 49 ML/MIN (ref 60–?)
GLOBULIN SER CALC-MCNC: 3.3 G/DL (ref 1.7–4.1)
GLUCOSE SERPL-MCNC: 132 MG/DL (ref 80–110)
HCO3 VBG: 25 MMOL/L (ref 24–28)
HEMATOCRIT: 33.9 % (ref 36–46)
HEMOGLOBIN: 11.2 G/DL (ref 12–16)
HEMOLYSIS: < 15 (ref 0–50)
INR PPP: 1.3 (ref 0.9–1.3)
LYMPHOCYTES # SPEC AUTO: 600 /UL (ref 1100–4500)
MCV RBC: 88 FL (ref 80–100)
MEAN CORPUSCULAR HEMOGLOBIN: 29 PG (ref 26–34)
MEAN CORPUSCULAR HGB CONC: 32.9 % (ref 30–36)
NT-PROBNP SERPL-MCNC: 8520 PG/ML (ref ?–450)
OXYGEN SATURATION VBG: 35 % (ref 70–75)
PCO2 BLDV: 36.2 MMHG (ref 45–50)
PH BLDV: 7.44 [PH] (ref 7.33–7.43)
PLATELET COUNT: 314 X10^3/UL (ref 150–400)
PO2 BLDV: 20 MMHG (ref 35–45)
POTASSIUM SERPL-SCNC: 4.3 MMOL/L (ref 3.4–5.1)
PROT SERPL-MCNC: 7.3 G/DL (ref 6.3–8.2)
PROTHROMBIN TIME: 14.6 SECONDS (ref 10.1–12.7)
PTT PARTIAL THROMBOPLASTIN TIM: 24 SECONDS (ref 26–36)
PTT PARTIAL THROMBOPLASTIN TIM: 34 SECONDS (ref 26–36)
SODIUM SERPL-SCNC: 133 MMOL/L (ref 137–145)
TOTAL CO2 VBG: 26 MMOL/L (ref 24–29)
TROPONIN I SERPL-MCNC: 0.23 NG/ML (ref 0.01–0.03)
TSH SERPL DL<=0.05 MIU/L-ACNC: 3.24 UIU/ML (ref 0.47–4.68)

## 2023-07-25 PROCEDURE — 85610 PROTHROMBIN TIME: CPT

## 2023-07-25 PROCEDURE — 94640 AIRWAY INHALATION TREATMENT: CPT

## 2023-07-25 PROCEDURE — 82805 BLOOD GASES W/O2 SATURATION: CPT

## 2023-07-25 PROCEDURE — 71275 CT ANGIOGRAPHY CHEST: CPT

## 2023-07-25 PROCEDURE — 82550 ASSAY OF CK (CPK): CPT

## 2023-07-25 PROCEDURE — 83880 ASSAY OF NATRIURETIC PEPTIDE: CPT

## 2023-07-25 PROCEDURE — 80053 COMPREHEN METABOLIC PANEL: CPT

## 2023-07-25 PROCEDURE — 36415 COLL VENOUS BLD VENIPUNCTURE: CPT

## 2023-07-25 PROCEDURE — 96376 TX/PRO/DX INJ SAME DRUG ADON: CPT

## 2023-07-25 PROCEDURE — 96375 TX/PRO/DX INJ NEW DRUG ADDON: CPT

## 2023-07-25 PROCEDURE — 96365 THER/PROPH/DIAG IV INF INIT: CPT

## 2023-07-25 PROCEDURE — 84443 ASSAY THYROID STIM HORMONE: CPT

## 2023-07-25 PROCEDURE — 99285 EMERGENCY DEPT VISIT HI MDM: CPT

## 2023-07-25 PROCEDURE — 99291 CRITICAL CARE FIRST HOUR: CPT

## 2023-07-25 PROCEDURE — 93005 ELECTROCARDIOGRAM TRACING: CPT

## 2023-07-25 PROCEDURE — 85730 THROMBOPLASTIN TIME PARTIAL: CPT

## 2023-07-25 PROCEDURE — 84484 ASSAY OF TROPONIN QUANT: CPT

## 2023-07-25 PROCEDURE — 85025 COMPLETE CBC W/AUTO DIFF WBC: CPT

## 2023-07-25 PROCEDURE — 96361 HYDRATE IV INFUSION ADD-ON: CPT

## 2023-07-25 PROCEDURE — 96366 THER/PROPH/DIAG IV INF ADDON: CPT

## 2023-07-26 VITALS — SYSTOLIC BLOOD PRESSURE: 116 MMHG | OXYGEN SATURATION: 85 % | HEART RATE: 102 BPM | DIASTOLIC BLOOD PRESSURE: 59 MMHG

## 2023-07-26 VITALS
HEART RATE: 87 BPM | DIASTOLIC BLOOD PRESSURE: 65 MMHG | OXYGEN SATURATION: 98 % | SYSTOLIC BLOOD PRESSURE: 120 MMHG | RESPIRATION RATE: 37 BRPM

## 2023-07-26 VITALS — OXYGEN SATURATION: 91 % | DIASTOLIC BLOOD PRESSURE: 55 MMHG | SYSTOLIC BLOOD PRESSURE: 116 MMHG | HEART RATE: 101 BPM

## 2023-07-26 VITALS
HEART RATE: 87 BPM | RESPIRATION RATE: 37 BRPM | DIASTOLIC BLOOD PRESSURE: 55 MMHG | OXYGEN SATURATION: 93 % | SYSTOLIC BLOOD PRESSURE: 113 MMHG

## 2023-07-26 VITALS — DIASTOLIC BLOOD PRESSURE: 66 MMHG | SYSTOLIC BLOOD PRESSURE: 149 MMHG | HEART RATE: 100 BPM | OXYGEN SATURATION: 92 %

## 2023-07-26 VITALS — RESPIRATION RATE: 43 BRPM | OXYGEN SATURATION: 97 % | HEART RATE: 95 BPM

## 2023-07-26 VITALS
RESPIRATION RATE: 32 BRPM | DIASTOLIC BLOOD PRESSURE: 51 MMHG | HEART RATE: 91 BPM | SYSTOLIC BLOOD PRESSURE: 104 MMHG | OXYGEN SATURATION: 92 %

## 2023-07-26 VITALS
SYSTOLIC BLOOD PRESSURE: 135 MMHG | HEART RATE: 117 BPM | DIASTOLIC BLOOD PRESSURE: 60 MMHG | OXYGEN SATURATION: 94 % | RESPIRATION RATE: 41 BRPM

## 2023-07-26 VITALS — OXYGEN SATURATION: 90 % | HEART RATE: 94 BPM | RESPIRATION RATE: 43 BRPM

## 2023-07-26 VITALS
RESPIRATION RATE: 27 BRPM | OXYGEN SATURATION: 97 % | HEART RATE: 80 BPM | DIASTOLIC BLOOD PRESSURE: 52 MMHG | SYSTOLIC BLOOD PRESSURE: 101 MMHG

## 2023-07-26 VITALS
SYSTOLIC BLOOD PRESSURE: 107 MMHG | HEART RATE: 93 BPM | OXYGEN SATURATION: 95 % | DIASTOLIC BLOOD PRESSURE: 64 MMHG | RESPIRATION RATE: 37 BRPM

## 2023-07-26 VITALS
DIASTOLIC BLOOD PRESSURE: 62 MMHG | HEART RATE: 90 BPM | RESPIRATION RATE: 40 BRPM | SYSTOLIC BLOOD PRESSURE: 111 MMHG | OXYGEN SATURATION: 91 %

## 2023-07-26 VITALS
DIASTOLIC BLOOD PRESSURE: 62 MMHG | HEART RATE: 97 BPM | OXYGEN SATURATION: 96 % | RESPIRATION RATE: 41 BRPM | SYSTOLIC BLOOD PRESSURE: 124 MMHG

## 2023-07-26 VITALS
RESPIRATION RATE: 29 BRPM | HEART RATE: 82 BPM | OXYGEN SATURATION: 97 % | DIASTOLIC BLOOD PRESSURE: 54 MMHG | SYSTOLIC BLOOD PRESSURE: 107 MMHG

## 2023-07-26 VITALS — DIASTOLIC BLOOD PRESSURE: 56 MMHG | OXYGEN SATURATION: 92 % | SYSTOLIC BLOOD PRESSURE: 119 MMHG | HEART RATE: 101 BPM

## 2023-07-26 VITALS
DIASTOLIC BLOOD PRESSURE: 52 MMHG | SYSTOLIC BLOOD PRESSURE: 93 MMHG | RESPIRATION RATE: 32 BRPM | OXYGEN SATURATION: 91 % | HEART RATE: 93 BPM

## 2023-07-26 VITALS
HEART RATE: 88 BPM | RESPIRATION RATE: 36 BRPM | DIASTOLIC BLOOD PRESSURE: 56 MMHG | SYSTOLIC BLOOD PRESSURE: 120 MMHG | OXYGEN SATURATION: 94 %

## 2023-07-26 VITALS
OXYGEN SATURATION: 99 % | SYSTOLIC BLOOD PRESSURE: 102 MMHG | DIASTOLIC BLOOD PRESSURE: 57 MMHG | HEART RATE: 94 BPM | RESPIRATION RATE: 34 BRPM

## 2023-07-26 VITALS
DIASTOLIC BLOOD PRESSURE: 55 MMHG | TEMPERATURE: 97.6 F | SYSTOLIC BLOOD PRESSURE: 102 MMHG | HEART RATE: 99 BPM | RESPIRATION RATE: 40 BRPM | OXYGEN SATURATION: 89 %

## 2023-07-26 VITALS — RESPIRATION RATE: 36 BRPM | HEART RATE: 89 BPM | OXYGEN SATURATION: 98 %

## 2023-07-26 VITALS
SYSTOLIC BLOOD PRESSURE: 124 MMHG | DIASTOLIC BLOOD PRESSURE: 56 MMHG | HEART RATE: 101 BPM | OXYGEN SATURATION: 93 % | RESPIRATION RATE: 48 BRPM

## 2023-07-26 VITALS
RESPIRATION RATE: 39 BRPM | HEART RATE: 98 BPM | OXYGEN SATURATION: 85 % | DIASTOLIC BLOOD PRESSURE: 58 MMHG | SYSTOLIC BLOOD PRESSURE: 135 MMHG

## 2023-07-26 VITALS
HEART RATE: 96 BPM | SYSTOLIC BLOOD PRESSURE: 113 MMHG | DIASTOLIC BLOOD PRESSURE: 57 MMHG | OXYGEN SATURATION: 97 % | RESPIRATION RATE: 38 BRPM

## 2023-07-26 VITALS
HEART RATE: 94 BPM | OXYGEN SATURATION: 90 % | RESPIRATION RATE: 45 BRPM | DIASTOLIC BLOOD PRESSURE: 57 MMHG | SYSTOLIC BLOOD PRESSURE: 120 MMHG

## 2023-07-26 VITALS — HEART RATE: 97 BPM | DIASTOLIC BLOOD PRESSURE: 66 MMHG | SYSTOLIC BLOOD PRESSURE: 106 MMHG | OXYGEN SATURATION: 96 %

## 2023-07-26 VITALS
RESPIRATION RATE: 29 BRPM | OXYGEN SATURATION: 96 % | DIASTOLIC BLOOD PRESSURE: 53 MMHG | HEART RATE: 82 BPM | SYSTOLIC BLOOD PRESSURE: 102 MMHG

## 2023-07-26 VITALS
RESPIRATION RATE: 48 BRPM | DIASTOLIC BLOOD PRESSURE: 55 MMHG | SYSTOLIC BLOOD PRESSURE: 123 MMHG | OXYGEN SATURATION: 90 % | HEART RATE: 108 BPM

## 2023-07-26 VITALS
OXYGEN SATURATION: 97 % | SYSTOLIC BLOOD PRESSURE: 109 MMHG | RESPIRATION RATE: 33 BRPM | HEART RATE: 92 BPM | DIASTOLIC BLOOD PRESSURE: 58 MMHG

## 2023-07-26 VITALS
RESPIRATION RATE: 32 BRPM | DIASTOLIC BLOOD PRESSURE: 57 MMHG | SYSTOLIC BLOOD PRESSURE: 97 MMHG | OXYGEN SATURATION: 95 % | HEART RATE: 91 BPM

## 2023-07-26 VITALS
DIASTOLIC BLOOD PRESSURE: 61 MMHG | HEART RATE: 92 BPM | RESPIRATION RATE: 46 BRPM | SYSTOLIC BLOOD PRESSURE: 126 MMHG | OXYGEN SATURATION: 97 %

## 2023-07-26 VITALS
RESPIRATION RATE: 39 BRPM | OXYGEN SATURATION: 97 % | DIASTOLIC BLOOD PRESSURE: 64 MMHG | HEART RATE: 87 BPM | SYSTOLIC BLOOD PRESSURE: 130 MMHG

## 2023-07-26 VITALS
RESPIRATION RATE: 31 BRPM | SYSTOLIC BLOOD PRESSURE: 102 MMHG | DIASTOLIC BLOOD PRESSURE: 53 MMHG | HEART RATE: 87 BPM | OXYGEN SATURATION: 94 %

## 2023-07-26 VITALS
HEART RATE: 90 BPM | SYSTOLIC BLOOD PRESSURE: 123 MMHG | OXYGEN SATURATION: 92 % | RESPIRATION RATE: 38 BRPM | DIASTOLIC BLOOD PRESSURE: 58 MMHG

## 2023-07-26 VITALS
RESPIRATION RATE: 37 BRPM | SYSTOLIC BLOOD PRESSURE: 113 MMHG | DIASTOLIC BLOOD PRESSURE: 60 MMHG | OXYGEN SATURATION: 96 % | HEART RATE: 88 BPM

## 2023-07-26 VITALS — SYSTOLIC BLOOD PRESSURE: 130 MMHG | OXYGEN SATURATION: 85 % | DIASTOLIC BLOOD PRESSURE: 60 MMHG | HEART RATE: 101 BPM

## 2023-07-26 VITALS
DIASTOLIC BLOOD PRESSURE: 58 MMHG | SYSTOLIC BLOOD PRESSURE: 119 MMHG | RESPIRATION RATE: 34 BRPM | OXYGEN SATURATION: 98 % | HEART RATE: 89 BPM

## 2023-07-26 VITALS — OXYGEN SATURATION: 96 % | HEART RATE: 96 BPM | RESPIRATION RATE: 43 BRPM

## 2023-07-26 VITALS
SYSTOLIC BLOOD PRESSURE: 110 MMHG | HEART RATE: 101 BPM | DIASTOLIC BLOOD PRESSURE: 57 MMHG | RESPIRATION RATE: 41 BRPM | OXYGEN SATURATION: 97 %

## 2023-07-26 VITALS
RESPIRATION RATE: 41 BRPM | SYSTOLIC BLOOD PRESSURE: 127 MMHG | DIASTOLIC BLOOD PRESSURE: 60 MMHG | HEART RATE: 100 BPM | OXYGEN SATURATION: 97 %

## 2023-07-26 VITALS
OXYGEN SATURATION: 93 % | DIASTOLIC BLOOD PRESSURE: 51 MMHG | HEART RATE: 83 BPM | SYSTOLIC BLOOD PRESSURE: 104 MMHG | RESPIRATION RATE: 28 BRPM

## 2023-07-26 VITALS
OXYGEN SATURATION: 91 % | HEART RATE: 88 BPM | SYSTOLIC BLOOD PRESSURE: 111 MMHG | DIASTOLIC BLOOD PRESSURE: 53 MMHG | RESPIRATION RATE: 33 BRPM

## 2023-07-26 VITALS
RESPIRATION RATE: 41 BRPM | SYSTOLIC BLOOD PRESSURE: 128 MMHG | OXYGEN SATURATION: 86 % | DIASTOLIC BLOOD PRESSURE: 68 MMHG | HEART RATE: 109 BPM

## 2023-07-26 VITALS
SYSTOLIC BLOOD PRESSURE: 120 MMHG | OXYGEN SATURATION: 95 % | DIASTOLIC BLOOD PRESSURE: 56 MMHG | HEART RATE: 80 BPM | RESPIRATION RATE: 29 BRPM

## 2023-07-26 VITALS
HEART RATE: 80 BPM | OXYGEN SATURATION: 96 % | RESPIRATION RATE: 27 BRPM | DIASTOLIC BLOOD PRESSURE: 51 MMHG | SYSTOLIC BLOOD PRESSURE: 99 MMHG

## 2023-07-26 VITALS — SYSTOLIC BLOOD PRESSURE: 132 MMHG | OXYGEN SATURATION: 90 % | DIASTOLIC BLOOD PRESSURE: 60 MMHG | HEART RATE: 95 BPM

## 2023-07-26 VITALS — OXYGEN SATURATION: 96 % | RESPIRATION RATE: 39 BRPM | HEART RATE: 101 BPM

## 2023-07-26 VITALS
HEART RATE: 91 BPM | RESPIRATION RATE: 38 BRPM | SYSTOLIC BLOOD PRESSURE: 115 MMHG | DIASTOLIC BLOOD PRESSURE: 58 MMHG | OXYGEN SATURATION: 90 %

## 2023-07-26 VITALS
DIASTOLIC BLOOD PRESSURE: 59 MMHG | RESPIRATION RATE: 36 BRPM | HEART RATE: 92 BPM | SYSTOLIC BLOOD PRESSURE: 131 MMHG | OXYGEN SATURATION: 99 %

## 2023-07-26 VITALS — OXYGEN SATURATION: 77 %

## 2023-07-26 VITALS — SYSTOLIC BLOOD PRESSURE: 111 MMHG | DIASTOLIC BLOOD PRESSURE: 53 MMHG

## 2023-07-26 VITALS — HEART RATE: 94 BPM | RESPIRATION RATE: 40 BRPM | OXYGEN SATURATION: 91 %

## 2023-07-26 LAB
PTT PARTIAL THROMBOPLASTIN TIM: 47 SECONDS (ref 26–36)
PTT PARTIAL THROMBOPLASTIN TIM: 49 SECONDS (ref 26–36)
PTT PARTIAL THROMBOPLASTIN TIM: 50 SECONDS (ref 26–36)
PTT PARTIAL THROMBOPLASTIN TIM: 55 SECONDS (ref 26–36)